# Patient Record
Sex: MALE | Race: BLACK OR AFRICAN AMERICAN | NOT HISPANIC OR LATINO | Employment: FULL TIME | RURAL
[De-identification: names, ages, dates, MRNs, and addresses within clinical notes are randomized per-mention and may not be internally consistent; named-entity substitution may affect disease eponyms.]

---

## 2022-01-24 ENCOUNTER — HOSPITAL ENCOUNTER (EMERGENCY)
Facility: HOSPITAL | Age: 52
Discharge: HOME | End: 2022-01-24
Attending: FAMILY MEDICINE
Payer: COMMERCIAL

## 2022-01-24 ENCOUNTER — OFFICE VISIT (OUTPATIENT)
Dept: FAMILY MEDICINE | Facility: CLINIC | Age: 52
End: 2022-01-24
Payer: COMMERCIAL

## 2022-01-24 VITALS
HEIGHT: 71 IN | WEIGHT: 211 LBS | DIASTOLIC BLOOD PRESSURE: 90 MMHG | OXYGEN SATURATION: 98 % | HEART RATE: 75 BPM | TEMPERATURE: 98 F | RESPIRATION RATE: 18 BRPM | SYSTOLIC BLOOD PRESSURE: 142 MMHG | BODY MASS INDEX: 29.54 KG/M2

## 2022-01-24 DIAGNOSIS — Z53.20 PROCEDURE NOT CARRIED OUT BECAUSE OF PATIENT'S DECISION: Primary | ICD-10-CM

## 2022-01-24 DIAGNOSIS — M10.9 GOUTY ARTHRITIS OF LEFT KNEE: ICD-10-CM

## 2022-01-24 DIAGNOSIS — M10.9 ACUTE GOUT OF LEFT KNEE, UNSPECIFIED CAUSE: Primary | ICD-10-CM

## 2022-01-24 DIAGNOSIS — M25.462 KNEE EFFUSION, LEFT: ICD-10-CM

## 2022-01-24 DIAGNOSIS — M25.562 ACUTE PAIN OF LEFT KNEE: ICD-10-CM

## 2022-01-24 LAB
ANION GAP SERPL CALCULATED.3IONS-SCNC: 14 MMOL/L (ref 7–16)
BASOPHILS # BLD AUTO: 0.02 K/UL (ref 0–0.2)
BASOPHILS NFR BLD AUTO: 0.2 % (ref 0–1)
BUN SERPL-MCNC: 10 MG/DL (ref 7–18)
BUN/CREAT SERPL: 11 (ref 6–20)
CALCIUM SERPL-MCNC: 8.6 MG/DL (ref 8.5–10.1)
CHLORIDE SERPL-SCNC: 101 MMOL/L (ref 98–107)
CO2 SERPL-SCNC: 27 MMOL/L (ref 21–32)
CREAT SERPL-MCNC: 0.95 MG/DL (ref 0.7–1.3)
DIFFERENTIAL METHOD BLD: ABNORMAL
EOSINOPHIL # BLD AUTO: 0.02 K/UL (ref 0–0.5)
EOSINOPHIL NFR BLD AUTO: 0.2 % (ref 1–4)
ERYTHROCYTE [DISTWIDTH] IN BLOOD BY AUTOMATED COUNT: 16 % (ref 11.5–14.5)
GLUCOSE SERPL-MCNC: 96 MG/DL (ref 74–106)
HCT VFR BLD AUTO: 44.7 % (ref 40–54)
HGB BLD-MCNC: 14.8 G/DL (ref 13.5–18)
IMM GRANULOCYTES # BLD AUTO: 0.03 K/UL (ref 0–0.04)
IMM GRANULOCYTES NFR BLD: 0.4 % (ref 0–0.4)
LYMPHOCYTES # BLD AUTO: 1.99 K/UL (ref 1–4.8)
LYMPHOCYTES NFR BLD AUTO: 23.2 % (ref 27–41)
MCH RBC QN AUTO: 26.5 PG (ref 27–31)
MCHC RBC AUTO-ENTMCNC: 33.1 G/DL (ref 32–36)
MCV RBC AUTO: 80 FL (ref 80–96)
MONOCYTES # BLD AUTO: 0.64 K/UL (ref 0–0.8)
MONOCYTES NFR BLD AUTO: 7.5 % (ref 2–6)
MPC BLD CALC-MCNC: 9.4 FL (ref 9.4–12.4)
NEUTROPHILS # BLD AUTO: 5.87 K/UL (ref 1.8–7.7)
NEUTROPHILS NFR BLD AUTO: 68.5 % (ref 53–65)
PLATELET # BLD AUTO: 269 K/UL (ref 150–400)
POTASSIUM SERPL-SCNC: 3.8 MMOL/L (ref 3.5–5.1)
RBC # BLD AUTO: 5.59 M/UL (ref 4.6–6.2)
SODIUM SERPL-SCNC: 138 MMOL/L (ref 136–145)
URATE SERPL-MCNC: 7.7 MG/DL (ref 3.5–7.2)
WBC # BLD AUTO: 8.57 K/UL (ref 4.5–11)

## 2022-01-24 PROCEDURE — 36415 COLL VENOUS BLD VENIPUNCTURE: CPT | Performed by: FAMILY MEDICINE

## 2022-01-24 PROCEDURE — 20610 PR DRAIN/INJECT LARGE JOINT/BURSA: ICD-10-PCS | Mod: LT,,, | Performed by: FAMILY MEDICINE

## 2022-01-24 PROCEDURE — 85025 COMPLETE CBC W/AUTO DIFF WBC: CPT | Performed by: FAMILY MEDICINE

## 2022-01-24 PROCEDURE — 99499 UNLISTED E&M SERVICE: CPT | Mod: ,,, | Performed by: FAMILY MEDICINE

## 2022-01-24 PROCEDURE — 84550 ASSAY OF BLOOD/URIC ACID: CPT | Performed by: FAMILY MEDICINE

## 2022-01-24 PROCEDURE — 80048 BASIC METABOLIC PNL TOTAL CA: CPT | Performed by: FAMILY MEDICINE

## 2022-01-24 PROCEDURE — 20610 DRAIN/INJ JOINT/BURSA W/O US: CPT | Mod: LT,,, | Performed by: FAMILY MEDICINE

## 2022-01-24 PROCEDURE — 87653 STREP B DNA AMP PROBE: CPT | Performed by: FAMILY MEDICINE

## 2022-01-24 PROCEDURE — 99284 EMERGENCY DEPT VISIT MOD MDM: CPT | Mod: 25

## 2022-01-24 PROCEDURE — 99283 EMERGENCY DEPT VISIT LOW MDM: CPT | Mod: 25,,, | Performed by: FAMILY MEDICINE

## 2022-01-24 PROCEDURE — 87205 SMEAR GRAM STAIN: CPT | Performed by: FAMILY MEDICINE

## 2022-01-24 PROCEDURE — 99283 PR EMERGENCY DEPT VISIT,LEVEL III: ICD-10-PCS | Mod: 25,,, | Performed by: FAMILY MEDICINE

## 2022-01-24 PROCEDURE — 20610 DRAIN/INJ JOINT/BURSA W/O US: CPT

## 2022-01-24 PROCEDURE — 25000003 PHARM REV CODE 250: Performed by: FAMILY MEDICINE

## 2022-01-24 PROCEDURE — 99499 NO LOS: ICD-10-PCS | Mod: ,,, | Performed by: FAMILY MEDICINE

## 2022-01-24 RX ORDER — LIDOCAINE HYDROCHLORIDE 10 MG/ML
2 INJECTION, SOLUTION EPIDURAL; INFILTRATION; INTRACAUDAL; PERINEURAL
Status: COMPLETED | OUTPATIENT
Start: 2022-01-24 | End: 2022-01-24

## 2022-01-24 RX ORDER — COLCHICINE 0.6 MG/1
TABLET ORAL
Qty: 10 TABLET | Refills: 0 | Status: SHIPPED | OUTPATIENT
Start: 2022-01-24

## 2022-01-24 RX ORDER — INDOMETHACIN 25 MG/1
25 CAPSULE ORAL
Qty: 15 CAPSULE | Refills: 0 | Status: SHIPPED | OUTPATIENT
Start: 2022-01-24

## 2022-01-24 RX ADMIN — LIDOCAINE HYDROCHLORIDE 20 MG: 10 INJECTION, SOLUTION EPIDURAL; INFILTRATION; INTRACAUDAL; PERINEURAL at 01:01

## 2022-01-24 NOTE — Clinical Note
"Brian Ortega" Bella was seen and treated in our emergency department on 1/24/2022.  He may return to work on 01/31/2022.       If you have any questions or concerns, please don't hesitate to call.       RN    "

## 2022-01-24 NOTE — ED PROVIDER NOTES
Encounter Date: 1/24/2022       History     Chief Complaint   Patient presents with    Knee Pain     Pt reports a 2 day h/o left knee pain and stiffness.  He presents to the ER today, because his employer noticed his limping and sent him home.  He has never had this in his knee before, but has had similar swelling, joint stiffness and sensation of heat at the base of his big toe about every 4 to 6 months or so.  This stays for a couple of days then resolves.  He has never had a doctor give a diagnosis to this, but he has suspected he may have gout.  No F/C.  No recent injury to knee.          Review of patient's allergies indicates:  No Known Allergies  History reviewed. No pertinent past medical history.  History reviewed. No pertinent surgical history.  History reviewed. No pertinent family history.     Review of Systems   Musculoskeletal: Positive for arthralgias (left knee).   All other systems reviewed and are negative.      Physical Exam     Initial Vitals [01/24/22 1322]   BP Pulse Resp Temp SpO2   (!) 148/109 81 18 98.3 °F (36.8 °C) 98 %      MAP       --         Physical Exam    Nursing note and vitals reviewed.  Constitutional: He appears well-developed and well-nourished.   HENT:   Head: Normocephalic and atraumatic.   Neck: Neck supple. No JVD present.   Normal range of motion.  Cardiovascular: Normal rate, regular rhythm, normal heart sounds and intact distal pulses. Exam reveals no gallop and no friction rub.    No murmur heard.  Pulmonary/Chest: Breath sounds normal. No respiratory distress. He has no wheezes. He has no rhonchi. He has no rales.   Musculoskeletal:         General: No tenderness or edema. Normal range of motion.      Cervical back: Normal range of motion and neck supple.      Comments: Left knee has visible effusion, and feels mildly warm compared to surrounding skin.  Pt has no pain at rest or with unloaded flexion/extension of the knee.  However, he does have pain with  weightbearing.  No laxity of the knee to Valgus/Varus strain, or to pivot-shift or Lachman's tests.       Neurological: He is alert and oriented to person, place, and time.   Skin: Skin is warm. Capillary refill takes less than 2 seconds.   Psychiatric: He has a normal mood and affect.         Medical Screening Exam   See Full Note    ED Course   Arthrocentesis    Date/Time: 2022 2:40 PM  Location procedure was performed: [facility] EMERGENCY DEPARTMENT  Performed by: Mian Jewell MD  Authorized by: Mian Jewell MD   Pre-op diagnosis: effusion  Post-op diagnosis: effusion  Consent Done: Yes  Consent: Verbal consent obtained.  Risks and benefits: risks, benefits and alternatives were discussed  Consent given by: patient  Patient understanding: patient states understanding of the procedure being performed  Patient consent: the patient's understanding of the procedure matches consent given  Procedure consent: procedure consent matches procedure scheduled  Relevant documents: relevant documents present and verified  Test results: test results available and properly labeled  Site marked: the operative site was marked  Imaging studies: imaging studies not available  Required items: required blood products, implants, devices, and special equipment available  Patient identity confirmed: , MRN, name, provided demographic data and verbally with patient  Indications: possible septic joint, diagnostic evaluation, pain and joint swelling   Body area: knee  Joint: left knee  Local anesthesia used: yes  Anesthesia: local infiltration    Anesthesia:  Local anesthesia used: yes  Local Anesthetic: lidocaine 1% without epinephrine    Patient sedated: no  Preparation: Patient was prepped and draped in the usual sterile fashion.  Needle size: 18 G  Approach: lateral  Aspirate amount: 26 mL  Aspirate: cloudy and yellow  Patient tolerance: Patient tolerated the procedure well with no immediate complications  Estimated  blood loss (mL): 0  Specimens: Yes (joint aspirate)  Implants: No  Comments: Pt reported decrease in pressure and pain post procedure.        Labs Reviewed   URIC ACID - Abnormal; Notable for the following components:       Result Value    Uric Acid 7.7 (*)     All other components within normal limits   CBC WITH DIFFERENTIAL - Abnormal; Notable for the following components:    MCH 26.5 (*)     RDW 16.0 (*)     Neutrophils % 68.5 (*)     Lymphocytes % 23.2 (*)     Monocytes % 7.5 (*)     Eosinophils % 0.2 (*)     All other components within normal limits   BASIC METABOLIC PANEL - Normal   GRAM STAIN   CULTURE, BODY FLUID   CBC W/ AUTO DIFFERENTIAL    Narrative:     The following orders were created for panel order CBC auto differential.  Procedure                               Abnormality         Status                     ---------                               -----------         ------                     CBC with Differential[[id number]]        Abnormal            Final result                 Please view results for these tests on the individual orders.   CRYSTAL IDENTIFICATION, SYNOVIAL          Imaging Results    None          Medications   LIDOcaine (PF) 10 mg/ml (1%) injection 20 mg (has no administration in time range)                       Clinical Impression:   Final diagnoses:  [M25.462] Knee effusion, left  [M25.562] Acute pain of left knee  [M10.9] Gouty arthritis of left knee  [M10.9] Acute gout of left knee, unspecified cause (Primary)          ED Disposition Condition    Discharge Stable        ED Prescriptions     Medication Sig Dispense Start Date End Date Auth. Provider    colchicine (COLCRYS) 0.6 mg tablet Take 2 tablets at first onset, then 1 tablet one hour later if pain persists.  Do not repeat within one week. 10 tablet 1/24/2022  Mian Jewell MD    indomethacin (INDOCIN) 25 MG capsule Take 1 capsule (25 mg total) by mouth 3 (three) times daily with meals. 15 capsule 1/24/2022  Mian  MD Best        Follow-up Information    None          Mian Jewell MD  01/24/22 1340       Mian Jewell MD  01/24/22 1500       Mian Jewell MD  01/24/22 4270

## 2022-01-24 NOTE — PROGRESS NOTES
Subjective:       Patient ID: Brian Blanco is a 51 y.o. male.    Chief Complaint: No chief complaint on file.    HPI  Review of Systems      Objective:      Physical Exam    Assessment:       Problem List Items Addressed This Visit    None         Plan:             The patient left the office before the visit was finished.

## 2022-01-24 NOTE — ED NOTES
"Band-Aid applied to puncture wound x 2 to left knee.  Ace wrap 6" applied to left knee.  Instr pt how to check circulation. Pt verbalized understanding.  "

## 2022-01-24 NOTE — DISCHARGE INSTRUCTIONS
Moist heat to affected area for 20-30 minutes daily.    Follow gout diet very carefully.  F/u with PCP in 3-4 days if not better, sooner if worse.    Return to ER if signs of infection or other urgency/emergency.

## 2022-01-25 ENCOUNTER — TELEPHONE (OUTPATIENT)
Dept: EMERGENCY MEDICINE | Facility: HOSPITAL | Age: 52
End: 2022-01-25
Payer: COMMERCIAL

## 2022-01-25 LAB
CRYSTALS SNV MICRO: NORMAL
GRAM STN SPEC: NORMAL

## 2022-01-27 LAB — BACTERIA SPEC BFLD CULT: NORMAL
